# Patient Record
Sex: MALE | Race: WHITE | NOT HISPANIC OR LATINO | Employment: FULL TIME | URBAN - METROPOLITAN AREA
[De-identification: names, ages, dates, MRNs, and addresses within clinical notes are randomized per-mention and may not be internally consistent; named-entity substitution may affect disease eponyms.]

---

## 2021-11-04 ENCOUNTER — OFFICE VISIT (OUTPATIENT)
Dept: FAMILY MEDICINE CLINIC | Facility: CLINIC | Age: 51
End: 2021-11-04

## 2021-11-04 VITALS
DIASTOLIC BLOOD PRESSURE: 78 MMHG | HEIGHT: 68 IN | WEIGHT: 184 LBS | SYSTOLIC BLOOD PRESSURE: 128 MMHG | RESPIRATION RATE: 16 BRPM | OXYGEN SATURATION: 99 % | HEART RATE: 93 BPM | BODY MASS INDEX: 27.89 KG/M2

## 2021-11-04 DIAGNOSIS — M54.50 CHRONIC LOW BACK PAIN WITHOUT SCIATICA, UNSPECIFIED BACK PAIN LATERALITY: ICD-10-CM

## 2021-11-04 DIAGNOSIS — M79.671 PAIN IN BOTH FEET: Primary | ICD-10-CM

## 2021-11-04 DIAGNOSIS — M79.672 PAIN IN BOTH FEET: Primary | ICD-10-CM

## 2021-11-04 DIAGNOSIS — G89.29 CHRONIC LOW BACK PAIN WITHOUT SCIATICA, UNSPECIFIED BACK PAIN LATERALITY: ICD-10-CM

## 2021-11-04 PROBLEM — B35.1 TOENAIL FUNGUS: Status: ACTIVE | Noted: 2021-03-17

## 2021-11-04 PROBLEM — E66.9 OBESITY, UNSPECIFIED: Status: ACTIVE | Noted: 2021-11-04

## 2021-11-04 PROBLEM — H71.90 CHOLESTEATOMA: Status: ACTIVE | Noted: 2021-06-30

## 2021-11-04 PROBLEM — J30.9 ALLERGIC RHINITIS: Status: ACTIVE | Noted: 2021-11-04

## 2021-11-04 PROCEDURE — 99203 OFFICE O/P NEW LOW 30 MIN: CPT | Performed by: NURSE PRACTITIONER

## 2021-11-04 RX ORDER — FLUTICASONE PROPIONATE 50 MCG
2 SPRAY, SUSPENSION (ML) NASAL DAILY
COMMUNITY
Start: 2021-10-07

## 2021-11-04 RX ORDER — GUAIFENESIN, PSEUDOEPHEDRINE HYDROCHLORIDE 600; 60 MG/1; MG/1
1 TABLET, EXTENDED RELEASE ORAL EVERY 12 HOURS
COMMUNITY

## 2021-11-04 RX ORDER — MULTIPLE VITAMINS W/ MINERALS TAB 9MG-400MCG
1 TAB ORAL DAILY
COMMUNITY

## 2021-11-04 NOTE — PATIENT INSTRUCTIONS
Return in about 9 months (around 8/4/2022) for Annual, Labs.  Call with any questions.     Chronic Back Pain  When back pain lasts longer than 3 months, it is called chronic back pain. The cause of your back pain may not be known. Some common causes include:  · Wear and tear (degenerative disease) of the bones, ligaments, or disks in your back.  · Inflammation and stiffness in your back (arthritis).  People who have chronic back pain often go through certain periods in which the pain is more intense (flare-ups). Many people can learn to manage the pain with home care.  Follow these instructions at home:  Pay attention to any changes in your symptoms. Take these actions to help with your pain:  Managing pain and stiffness         · If directed, apply ice to the painful area. Your health care provider may recommend applying ice during the first 24-48 hours after a flare-up begins. To do this:  ? Put ice in a plastic bag.  ? Place a towel between your skin and the bag.  ? Leave the ice on for 20 minutes, 2-3 times per day.  · If directed, apply heat to the affected area as often as told by your health care provider. Use the heat source that your health care provider recommends, such as a moist heat pack or a heating pad.  ? Place a towel between your skin and the heat source.  ? Leave the heat on for 20-30 minutes.  ? Remove the heat if your skin turns bright red. This is especially important if you are unable to feel pain, heat, or cold. You may have a greater risk of getting burned.  · Try soaking in a warm tub.  Activity    · Avoid bending and other activities that make the problem worse.  · Maintain a proper position when standing or sitting:  ? When standing, keep your upper back and neck straight, with your shoulders pulled back. Avoid slouching.  ? When sitting, keep your back straight and relax your shoulders. Do not round your shoulders or pull them backward.  · Do not sit or  one place for long  periods of time.  · Take brief periods of rest throughout the day. This will reduce your pain. Resting in a lying or standing position is usually better than sitting to rest.  · When you are resting for longer periods, mix in some mild activity or stretching between periods of rest. This will help to prevent stiffness and pain.  · Get regular exercise. Ask your health care provider what activities are safe for you.  · Do not lift anything that is heavier than 10 lb (4.5 kg), or the limit that you are told, until your health care provider says that it is safe. Always use proper lifting technique, which includes:  ? Bending your knees.  ? Keeping the load close to your body.  ? Avoiding twisting.  · Sleep on a firm mattress in a comfortable position. Try lying on your side with your knees slightly bent. If you lie on your back, put a pillow under your knees.    Medicines  · Treatment may include medicines for pain and inflammation taken by mouth or applied to the skin, prescription pain medicine, or muscle relaxants. Take over-the-counter and prescription medicines only as told by your health care provider.  · Ask your health care provider if the medicine prescribed to you:  ? Requires you to avoid driving or using machinery.  ? Can cause constipation. You may need to take these actions to prevent or treat constipation:  § Drink enough fluid to keep your urine pale yellow.  § Take over-the-counter or prescription medicines.  § Eat foods that are high in fiber, such as beans, whole grains, and fresh fruits and vegetables.  § Limit foods that are high in fat and processed sugars, such as fried or sweet foods.  General instructions  · Do not use any products that contain nicotine or tobacco, such as cigarettes, e-cigarettes, and chewing tobacco. If you need help quitting, ask your health care provider.  · Keep all follow-up visits as told by your health care provider. This is important.  Contact a health care provider  if:  · You have pain that is not relieved with rest or medicine.  · Your pain gets worse, or you have new pain.  · You have a high fever.  · You have rapid weight loss.  · You have trouble doing your normal activities.  Get help right away if:  · You have weakness or numbness in one or both of your legs or feet.  · You have trouble controlling your bladder or your bowels.  · You have severe back pain and have any of the following:  ? Nausea or vomiting.  ? Pain in your abdomen.  ? Shortness of breath or you faint.  Summary  · Chronic back pain is back pain that lasts longer than 3 months.  · When a flare-up begins, apply ice to the painful area for the first 24-48 hours.  · Apply a moist heat pad or use a heating pad on the painful area as directed by your health care provider.  · When you are resting for longer periods, mix in some mild activity or stretching between periods of rest. This will help to prevent stiffness and pain.  This information is not intended to replace advice given to you by your health care provider. Make sure you discuss any questions you have with your health care provider.  Document Revised: 01/27/2021 Document Reviewed: 01/27/2021  [employer]vier Patient Education © 2021 [employer]

## 2021-11-04 NOTE — PROGRESS NOTES
Subjective   Edgar Heath is a 51 y.o. male.     History of Present Illness   Patient presents with c/o foot pain, bilaterally, but mainly in the right heel.He reports that this started several months ago.  Patient reports that the pain is worse after sitting for long periods or in the AM upon waking. Patient reports that he is cognizant of this as he's walking daily.     Patient presents with c/o low back pain, ongoing for about 14 years. Patient reports that the pain in his lower back is worse after sleeping or sitting for long periods of time. He is not currently taking anything for this.     The following portions of the patient's history were reviewed and updated as appropriate: allergies, current medications, past family history, past medical history, past social history, past surgical history and problem list.    Review of Systems   Constitutional: Negative for chills, fatigue and fever.   Respiratory: Negative for cough, chest tightness, shortness of breath and wheezing.    Cardiovascular: Negative for chest pain, palpitations and leg swelling.   Musculoskeletal: Positive for arthralgias (Bilateral foot pain) and back pain. Negative for gait problem, joint swelling, myalgias, neck pain and neck stiffness.   Skin: Negative for dry skin.   Neurological: Negative for dizziness, weakness, numbness and headache.   Psychiatric/Behavioral: Negative.        Objective   Physical Exam  Vitals and nursing note reviewed.   Constitutional:       Appearance: He is well-developed.   HENT:      Head: Normocephalic and atraumatic.      Right Ear: Ear canal and external ear normal. Tympanic membrane is scarred.      Left Ear: Ear canal and external ear normal.   Eyes:      Conjunctiva/sclera: Conjunctivae normal.      Pupils: Pupils are equal, round, and reactive to light.   Neck:      Thyroid: No thyromegaly.   Cardiovascular:      Rate and Rhythm: Normal rate and regular rhythm.      Heart sounds: Normal heart  sounds. No murmur heard.      Pulmonary:      Effort: Pulmonary effort is normal.      Breath sounds: Normal breath sounds.   Musculoskeletal:         General: No tenderness or deformity. Normal range of motion.      Cervical back: Normal, full passive range of motion without pain, normal range of motion and neck supple.      Thoracic back: Normal. No bony tenderness. Normal range of motion.      Lumbar back: Normal. No swelling, edema, signs of trauma, spasms, tenderness or bony tenderness. Normal range of motion. Negative right straight leg raise test and negative left straight leg raise test.      Right lower leg: No edema.      Left lower leg: No edema.   Feet:      Right foot:      Skin integrity: Skin integrity normal. No skin breakdown or erythema.      Toenail Condition: Right toenails are abnormally thick and long. Fungal disease present.     Left foot:      Skin integrity: Skin integrity normal. No skin breakdown or erythema.      Toenail Condition: Left toenails are long.   Lymphadenopathy:      Cervical: No cervical adenopathy.   Skin:     General: Skin is warm and dry.   Neurological:      Mental Status: He is alert and oriented to person, place, and time.   Psychiatric:         Behavior: Behavior normal.         Thought Content: Thought content normal.         Judgment: Judgment normal.         Vitals:    11/04/21 1031   BP: 128/78   Pulse: 93   Resp: 16   SpO2: 99%     Body mass index is 27.89 kg/m².    Procedures    Assessment/Plan   Problems Addressed this Visit     None      Visit Diagnoses     Pain in both feet    -  Primary    Relevant Orders    Ambulatory Referral to Podiatry (Completed)    Chronic low back pain without sciatica, unspecified back pain laterality        Relevant Orders    Ambulatory Referral to Physical Therapy Evaluate and treat (Completed)      Diagnoses       Codes Comments    Pain in both feet    -  Primary ICD-10-CM: M79.671, M79.672  ICD-9-CM: 729.5     Chronic low back  pain without sciatica, unspecified back pain laterality     ICD-10-CM: M54.50, G89.29  ICD-9-CM: 724.2, 338.29         Patient is taking several over the counter supplements for his allergies, Ground IVY, Guercitin, Krill Oil, Magnesium powder drink and antifungal powder that his herbalist has recommended.     Referral to Podiatry  Referral to Physical therapy       Return in about 9 months (around 8/4/2022) for Annual, Labs.       Patient Instructions   Return in about 9 months (around 8/4/2022) for Annual, Labs.  Call with any questions.     Chronic Back Pain  When back pain lasts longer than 3 months, it is called chronic back pain. The cause of your back pain may not be known. Some common causes include:  · Wear and tear (degenerative disease) of the bones, ligaments, or disks in your back.  · Inflammation and stiffness in your back (arthritis).  People who have chronic back pain often go through certain periods in which the pain is more intense (flare-ups). Many people can learn to manage the pain with home care.  Follow these instructions at home:  Pay attention to any changes in your symptoms. Take these actions to help with your pain:  Managing pain and stiffness         · If directed, apply ice to the painful area. Your health care provider may recommend applying ice during the first 24-48 hours after a flare-up begins. To do this:  ? Put ice in a plastic bag.  ? Place a towel between your skin and the bag.  ? Leave the ice on for 20 minutes, 2-3 times per day.  · If directed, apply heat to the affected area as often as told by your health care provider. Use the heat source that your health care provider recommends, such as a moist heat pack or a heating pad.  ? Place a towel between your skin and the heat source.  ? Leave the heat on for 20-30 minutes.  ? Remove the heat if your skin turns bright red. This is especially important if you are unable to feel pain, heat, or cold. You may have a greater risk of  getting burned.  · Try soaking in a warm tub.  Activity    · Avoid bending and other activities that make the problem worse.  · Maintain a proper position when standing or sitting:  ? When standing, keep your upper back and neck straight, with your shoulders pulled back. Avoid slouching.  ? When sitting, keep your back straight and relax your shoulders. Do not round your shoulders or pull them backward.  · Do not sit or  one place for long periods of time.  · Take brief periods of rest throughout the day. This will reduce your pain. Resting in a lying or standing position is usually better than sitting to rest.  · When you are resting for longer periods, mix in some mild activity or stretching between periods of rest. This will help to prevent stiffness and pain.  · Get regular exercise. Ask your health care provider what activities are safe for you.  · Do not lift anything that is heavier than 10 lb (4.5 kg), or the limit that you are told, until your health care provider says that it is safe. Always use proper lifting technique, which includes:  ? Bending your knees.  ? Keeping the load close to your body.  ? Avoiding twisting.  · Sleep on a firm mattress in a comfortable position. Try lying on your side with your knees slightly bent. If you lie on your back, put a pillow under your knees.    Medicines  · Treatment may include medicines for pain and inflammation taken by mouth or applied to the skin, prescription pain medicine, or muscle relaxants. Take over-the-counter and prescription medicines only as told by your health care provider.  · Ask your health care provider if the medicine prescribed to you:  ? Requires you to avoid driving or using machinery.  ? Can cause constipation. You may need to take these actions to prevent or treat constipation:  § Drink enough fluid to keep your urine pale yellow.  § Take over-the-counter or prescription medicines.  § Eat foods that are high in fiber, such as beans,  whole grains, and fresh fruits and vegetables.  § Limit foods that are high in fat and processed sugars, such as fried or sweet foods.  General instructions  · Do not use any products that contain nicotine or tobacco, such as cigarettes, e-cigarettes, and chewing tobacco. If you need help quitting, ask your health care provider.  · Keep all follow-up visits as told by your health care provider. This is important.  Contact a health care provider if:  · You have pain that is not relieved with rest or medicine.  · Your pain gets worse, or you have new pain.  · You have a high fever.  · You have rapid weight loss.  · You have trouble doing your normal activities.  Get help right away if:  · You have weakness or numbness in one or both of your legs or feet.  · You have trouble controlling your bladder or your bowels.  · You have severe back pain and have any of the following:  ? Nausea or vomiting.  ? Pain in your abdomen.  ? Shortness of breath or you faint.  Summary  · Chronic back pain is back pain that lasts longer than 3 months.  · When a flare-up begins, apply ice to the painful area for the first 24-48 hours.  · Apply a moist heat pad or use a heating pad on the painful area as directed by your health care provider.  · When you are resting for longer periods, mix in some mild activity or stretching between periods of rest. This will help to prevent stiffness and pain.  This information is not intended to replace advice given to you by your health care provider. Make sure you discuss any questions you have with your health care provider.  Document Revised: 01/27/2021 Document Reviewed: 01/27/2021  [employer]vier Patient Education © 2021 [employer]

## 2022-06-02 DIAGNOSIS — M54.50 CHRONIC LOW BACK PAIN WITHOUT SCIATICA, UNSPECIFIED BACK PAIN LATERALITY: Primary | ICD-10-CM

## 2022-06-02 DIAGNOSIS — G89.29 CHRONIC LOW BACK PAIN WITHOUT SCIATICA, UNSPECIFIED BACK PAIN LATERALITY: Primary | ICD-10-CM

## 2025-07-24 ENCOUNTER — OFFICE VISIT (OUTPATIENT)
Dept: FAMILY MEDICINE CLINIC | Facility: CLINIC | Age: 55
End: 2025-07-24
Payer: COMMERCIAL

## 2025-07-24 VITALS
WEIGHT: 171 LBS | DIASTOLIC BLOOD PRESSURE: 68 MMHG | HEART RATE: 80 BPM | BODY MASS INDEX: 25.91 KG/M2 | HEIGHT: 68 IN | OXYGEN SATURATION: 99 % | SYSTOLIC BLOOD PRESSURE: 122 MMHG | RESPIRATION RATE: 18 BRPM

## 2025-07-24 DIAGNOSIS — Z12.11 SCREEN FOR COLON CANCER: ICD-10-CM

## 2025-07-24 DIAGNOSIS — Z00.00 ANNUAL PHYSICAL EXAM: Primary | ICD-10-CM

## 2025-07-24 DIAGNOSIS — Z11.59 NEED FOR HEPATITIS C SCREENING TEST: ICD-10-CM

## 2025-07-24 DIAGNOSIS — Z13.228 ENCOUNTER FOR SCREENING FOR OTHER METABOLIC DISORDERS: ICD-10-CM

## 2025-07-24 DIAGNOSIS — Z13.220 SCREENING FOR HYPERLIPIDEMIA: ICD-10-CM

## 2025-07-24 DIAGNOSIS — K21.9 GASTROESOPHAGEAL REFLUX DISEASE WITHOUT ESOPHAGITIS: ICD-10-CM

## 2025-07-24 DIAGNOSIS — Z12.5 SCREENING PSA (PROSTATE SPECIFIC ANTIGEN): ICD-10-CM

## 2025-07-24 PROBLEM — M72.2 PLANTAR FASCIA SYNDROME: Status: ACTIVE | Noted: 2025-07-24

## 2025-07-24 RX ORDER — MAGNESIUM CARB/ALUMINUM HYDROX 105-160MG
TABLET,CHEWABLE ORAL ONCE
COMMUNITY

## 2025-07-24 RX ORDER — OMEPRAZOLE 20 MG/1
20 CAPSULE, DELAYED RELEASE ORAL DAILY
Qty: 30 CAPSULE | Refills: 3 | Status: SHIPPED | OUTPATIENT
Start: 2025-07-24

## 2025-07-24 RX ORDER — BIOTIN 5 MG
TABLET ORAL
COMMUNITY

## 2025-07-24 NOTE — PATIENT INSTRUCTIONS
I will call call or send DocDoc message with your lab results.   Please call with any questions or concerns.    Return in about 1 year (around 7/24/2026) for Annual, Labs.

## 2025-07-24 NOTE — PROGRESS NOTES
Preventive Exam    History of Present Illness: Edgar Heath is a 55 y.o. here for check up and review of routine health maintenance. he states he is doing well and has no concerns.    Past medical history, surgical history and family history have been reviewed.     History of Present Illness  The patient presents for a physical exam and to discuss ongoing acid reflux. He has not been in office for over 4 years, so is new patient today.     He reports being in good health since his last visit in 11/2021, with no significant changes to his medical history. He is not a smoker and has become a non-drinker as he has aged. 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 He underwent a colon cancer screening during the COVID-19 pandemic and is due for another one. He reports no rectal bleeding. There are no changes in appetite, fever, chills, or fatigue. He experiences nasal congestion and runny nose due to allergies. His dental exams are up to date. He uses reading glasses and reports no eye problems such as blurred or double vision, or light sensitivity. He is due for an eye exam. He reports no chest tightness, cough, shortness of breath, chest pain, leg swelling, heart racing, abdominal pain, constipation, diarrhea, nausea, vomiting, or urinary difficulties. He reports no dizziness, headaches, depression, or anxiety. He has no food allergies. He is taking cetirizine, elderberry immunity gummies, fluticasone, krill oil, magnesium, multivitamin, and Mucinex D.    He has been experiencing acid reflux for about a year before his first visit here. His previous doctor advised him to elevate himself without propping up and keep antacids on hand. This advice helped temporarily, but the symptoms returned in the last year. He was taking 2 to 3 chewable antacids almost every night. He tried over-the-counter Prilosec, which initially alleviated the symptoms regardless of his diet, but they returned after a  week. He has since reduced his acid intake and made dietary changes, including avoiding deep-fried foods, spicy foods, and tomato ketchup, and limiting himself to one cup of low-acid coffee per day. He also drinks kefir when he experiences mild symptoms. He took Prilosec for 12 days, which again alleviated the symptoms regardless of his diet, but they returned once he stopped the medication. However, the symptoms are now much milder. He sometimes experiences burping and a burning sensation during the day, which he manages by drinking water. He also noticed that the symptoms occur when he does not eat frequently enough.    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 The right ear was severely affected due to delayed treatment, leading to damage to the ear bone. Despite trying various allergy medications and receiving allergy injections, the suction in the eardrum pocket where the cholesteatoma was building up persisted. A canal wall down procedure was performed, which has a 97% success rate for this condition. Due to significant hearing loss, a bone-anchored hearing aid was recommended and implanted. This has improved his hearing considerably.    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 Bone-anchored hearing aid implantation.    FAMILY HISTORY  His mother is still alive at 82 years old and doing great. A maternal aunt was diagnosed with lung cancer this summer and is a chronic smoker and drinker. There is a family history of cancer on both sides. There is no family history of diabetes.       Review of Systems   Constitutional:  Negative for appetite change, chills, fatigue and fever.   HENT:   Positive for hearing loss (hearing aids present). Negative for congestion, dental problem, facial swelling, sinus pressure and sore throat.    Eyes: Negative.  Negative for blurred vision, double vision, photophobia and visual disturbance.   Respiratory:  Negative for cough, chest tightness, shortness of breath and wheezing.    Cardiovascular:  Negative for chest pain, palpitations and leg swelling.   Gastrointestinal:  Positive for GERD. Negative for abdominal pain, constipation, diarrhea, nausea and vomiting.   Endocrine: Negative.  Negative for cold intolerance and heat intolerance.   Genitourinary: Negative.  Negative for decreased libido, decreased urine volume, difficulty urinating, discharge, dysuria, frequency, genital sores, penile pain, penile swelling and scrotal swelling.   Musculoskeletal:  Negative for arthralgias, back pain, joint swelling and myalgias.   Skin: Negative.  Negative for color change and rash.   Allergic/Immunologic: Positive for environmental allergies. Negative for food allergies.   Neurological:  Negative for dizziness, speech difficulty, weakness, numbness and headache.   Hematological: Negative.  Does not bruise/bleed easily.   Psychiatric/Behavioral: Negative.  Negative for behavioral problems, dysphoric mood, self-injury, suicidal ideas and depressed mood. The patient is not nervous/anxious.        PHYSICAL EXAM    Vitals:    07/24/25 0903   BP: 122/68   Pulse: 80   Resp: 18   SpO2: 99%       Body mass index is 25.92 kg/m².   BMI is >= 25 and <30. (Overweight) The following options were offered after discussion;: exercise counseling/recommendations and nutrition counseling/recommendations       Physical Exam  Vitals and nursing note reviewed.   Constitutional:       Appearance: Normal appearance. He is well-developed and normal weight.   HENT:      Head: Normocephalic and atraumatic.      Right Ear: Tympanic membrane, ear canal and external ear normal.      Left Ear: Tympanic  membrane, ear canal and external ear normal.      Nose: Nose normal.      Mouth/Throat:      Lips: Pink.      Mouth: Mucous membranes are moist.      Tongue: No lesions.      Palate: No mass and lesions.      Pharynx: Oropharynx is clear. Uvula midline.      Tonsils: No tonsillar exudate.   Eyes:      Conjunctiva/sclera: Conjunctivae normal.      Pupils: Pupils are equal, round, and reactive to light.   Neck:      Thyroid: No thyromegaly.   Cardiovascular:      Rate and Rhythm: Normal rate and regular rhythm.      Pulses: Normal pulses.           Dorsalis pedis pulses are 2+ on the right side and 2+ on the left side.        Posterior tibial pulses are 2+ on the right side and 2+ on the left side.      Heart sounds: Normal heart sounds. No murmur heard.  Pulmonary:      Effort: Pulmonary effort is normal.      Breath sounds: Normal breath sounds.   Abdominal:      General: Bowel sounds are normal. There is no distension.      Palpations: Abdomen is soft.      Tenderness: There is no abdominal tenderness.   Musculoskeletal:         General: No deformity. Normal range of motion.      Cervical back: Normal range of motion and neck supple.      Right lower leg: No edema.      Left lower leg: No edema.   Lymphadenopathy:      Head:      Right side of head: No submental, submandibular, tonsillar, preauricular, posterior auricular or occipital adenopathy.      Left side of head: No submental, submandibular, tonsillar, preauricular, posterior auricular or occipital adenopathy.      Cervical: No cervical adenopathy.      Right cervical: No superficial, deep or posterior cervical adenopathy.     Left cervical: No superficial, deep or posterior cervical adenopathy.      Upper Body:      Right upper body: No supraclavicular adenopathy.      Left upper body: No supraclavicular adenopathy.   Skin:     General: Skin is warm and dry.      Capillary Refill: Capillary refill takes 2 to 3 seconds.   Neurological:      General: No focal  deficit present.      Mental Status: He is alert and oriented to person, place, and time.      Cranial Nerves: No cranial nerve deficit.      Sensory: Sensation is intact.      Motor: Motor function is intact.      Coordination: Coordination is intact.      Gait: Gait is intact.   Psychiatric:         Attention and Perception: Attention and perception normal.         Mood and Affect: Mood and affect normal.         Speech: Speech normal.         Behavior: Behavior normal. Behavior is cooperative.         Thought Content: Thought content normal.         Cognition and Memory: Cognition and memory normal.         Judgment: Judgment normal.         Results         Procedures    Diagnoses and all orders for this visit:    1. Annual physical exam (Primary)    2. Need for hepatitis C screening test  -     Hepatitis C antibody    3. Screen for colon cancer  -     Cologuard - Stool, Per Rectum; Future    4. Encounter for screening for other metabolic disorders  -     CBC & Differential  -     Comprehensive Metabolic Panel    5. Screening for hyperlipidemia  -     Lipid Panel With / Chol / HDL Ratio    6. Screening PSA (prostate specific antigen)  -     PSA Screen    7. Gastroesophageal reflux disease without esophagitis  -     omeprazole (priLOSEC) 20 MG capsule; Take 1 capsule by mouth Daily.  Dispense: 30 capsule; Refill: 3           1. Health maintenance.  - Blood pressure readings are within the normal range.  - Cologuard test will be ordered for colon cancer screening.  - Fasting labs will be conducted to assess cholesterol levels, liver and kidney function, glucose levels, electrolytes, and blood count.  - Prostate-specific antigen (PSA) test will also be ordered.    2. Acid reflux.  - Experiencing acid reflux symptoms, managed with dietary changes and over-the-counter antacids.  - Prilosec 20 mg once daily has been prescribed.  - Advised to take famotidine up to 80 mg on days when symptoms are severe.  - If the  current dosage of Prilosec proves ineffective over time, an increase in dosage may be considered.       Problems Addressed this Visit    None  Visit Diagnoses         Annual physical exam    -  Primary      Need for hepatitis C screening test        Relevant Orders    Hepatitis C antibody      Screen for colon cancer        Relevant Orders    Cologuard - Stool, Per Rectum      Encounter for screening for other metabolic disorders        Relevant Orders    CBC & Differential    Comprehensive Metabolic Panel      Screening for hyperlipidemia        Relevant Orders    Lipid Panel With / Chol / HDL Ratio      Screening PSA (prostate specific antigen)        Relevant Orders    PSA Screen      Gastroesophageal reflux disease without esophagitis        Relevant Medications    omeprazole (priLOSEC) 20 MG capsule          Diagnoses         Codes Comments      Annual physical exam    -  Primary ICD-10-CM: Z00.00  ICD-9-CM: V70.0       Need for hepatitis C screening test     ICD-10-CM: Z11.59  ICD-9-CM: V73.89       Screen for colon cancer     ICD-10-CM: Z12.11  ICD-9-CM: V76.51       Encounter for screening for other metabolic disorders     ICD-10-CM: Z13.228  ICD-9-CM: V77.99       Screening for hyperlipidemia     ICD-10-CM: Z13.220  ICD-9-CM: V77.91       Screening PSA (prostate specific antigen)     ICD-10-CM: Z12.5  ICD-9-CM: V76.44       Gastroesophageal reflux disease without esophagitis     ICD-10-CM: K21.9  ICD-9-CM: 530.81             Routine health maintenance reviewed and discussed with Edgar Heath.    Preventative counseling regarding healthy diet and exercise.   Pt reports that he wears a seatbelt regularly.     Patient or patient representative verbalized consent for the use of Ambient Listening during the visit with  IVÁN Gilliam for chart documentation. 7/24/2025  09:18 EDT    Return in about 1 year (around 7/24/2026) for Annual, Labs.

## 2025-07-25 ENCOUNTER — RESULTS FOLLOW-UP (OUTPATIENT)
Dept: FAMILY MEDICINE CLINIC | Facility: CLINIC | Age: 55
End: 2025-07-25
Payer: COMMERCIAL

## 2025-07-25 ENCOUNTER — TELEPHONE (OUTPATIENT)
Dept: FAMILY MEDICINE CLINIC | Facility: CLINIC | Age: 55
End: 2025-07-25
Payer: COMMERCIAL

## 2025-07-25 DIAGNOSIS — D64.9 ANEMIA, UNSPECIFIED TYPE: Primary | ICD-10-CM

## 2025-07-25 DIAGNOSIS — D50.0 ANEMIA DUE TO BLOOD LOSS: Primary | ICD-10-CM

## 2025-07-25 LAB
ALBUMIN SERPL-MCNC: 4.5 G/DL (ref 3.5–5.2)
ALBUMIN/GLOB SERPL: 1.8 G/DL
ALP SERPL-CCNC: 65 U/L (ref 39–117)
ALT SERPL-CCNC: 35 U/L (ref 1–41)
AST SERPL-CCNC: 34 U/L (ref 1–40)
BASOPHILS # BLD MANUAL: 0.06 10*3/MM3 (ref 0–0.2)
BASOPHILS NFR BLD MANUAL: 1 % (ref 0–1.5)
BILIRUB SERPL-MCNC: 0.2 MG/DL (ref 0–1.2)
BUN SERPL-MCNC: 11 MG/DL (ref 6–20)
BUN/CREAT SERPL: 17.5 (ref 7–25)
CALCIUM SERPL-MCNC: 9.1 MG/DL (ref 8.6–10.5)
CHLORIDE SERPL-SCNC: 103 MMOL/L (ref 98–107)
CHOLEST SERPL-MCNC: 138 MG/DL (ref 0–200)
CHOLEST/HDLC SERPL: 3.37 {RATIO}
CO2 SERPL-SCNC: 24.4 MMOL/L (ref 22–29)
CREAT SERPL-MCNC: 0.63 MG/DL (ref 0.76–1.27)
DIFFERENTIAL COMMENT: NORMAL
EGFRCR SERPLBLD CKD-EPI 2021: 113 ML/MIN/1.73
ERYTHROCYTE [DISTWIDTH] IN BLOOD BY AUTOMATED COUNT: 18.4 % (ref 12.3–15.4)
GLOBULIN SER CALC-MCNC: 2.5 GM/DL
GLUCOSE SERPL-MCNC: 97 MG/DL (ref 65–99)
HCT VFR BLD AUTO: 30.7 % (ref 37.5–51)
HCV IGG SERPL QL IA: NON REACTIVE
HDLC SERPL-MCNC: 41 MG/DL (ref 40–60)
HGB BLD-MCNC: 7.9 G/DL (ref 13–17.7)
LDLC SERPL CALC-MCNC: 83 MG/DL (ref 0–100)
LYMPHOCYTES # BLD MANUAL: 1.66 10*3/MM3 (ref 0.7–3.1)
LYMPHOCYTES NFR BLD MANUAL: 29.6 % (ref 19.6–45.3)
MCH RBC QN AUTO: 16.9 PG (ref 26.6–33)
MCHC RBC AUTO-ENTMCNC: 25.7 G/DL (ref 31.5–35.7)
MCV RBC AUTO: 65.7 FL (ref 79–97)
MONOCYTES # BLD MANUAL: 0.46 10*3/MM3 (ref 0.1–0.9)
MONOCYTES NFR BLD MANUAL: 8.2 % (ref 5–12)
NEUTROPHILS # BLD MANUAL: 3.43 10*3/MM3 (ref 1.7–7)
NEUTROPHILS NFR BLD MANUAL: 61.2 % (ref 42.7–76)
NRBC BLD AUTO-RTO: 0.4 /100 WBC (ref 0–0.2)
PLATELET # BLD AUTO: 436 10*3/MM3 (ref 140–450)
PLATELET BLD QL SMEAR: NORMAL
POTASSIUM SERPL-SCNC: 5 MMOL/L (ref 3.5–5.2)
PROT SERPL-MCNC: 7 G/DL (ref 6–8.5)
PSA SERPL-MCNC: 1.31 NG/ML (ref 0–4)
RBC # BLD AUTO: 4.67 10*6/MM3 (ref 4.14–5.8)
RBC MORPH BLD: NORMAL
SODIUM SERPL-SCNC: 137 MMOL/L (ref 136–145)
TRIGL SERPL-MCNC: 68 MG/DL (ref 0–150)
VLDLC SERPL CALC-MCNC: 14 MG/DL (ref 5–40)
WBC # BLD AUTO: 5.61 10*3/MM3 (ref 3.4–10.8)

## 2025-07-29 LAB — PSA SERPL-MCNC: 1.41 NG/ML (ref 0–4)
